# Patient Record
Sex: MALE | Race: WHITE
[De-identification: names, ages, dates, MRNs, and addresses within clinical notes are randomized per-mention and may not be internally consistent; named-entity substitution may affect disease eponyms.]

---

## 2018-04-07 ENCOUNTER — HOSPITAL ENCOUNTER (EMERGENCY)
Dept: HOSPITAL 17 - PHED | Age: 83
Discharge: HOME | End: 2018-04-07
Payer: OTHER GOVERNMENT

## 2018-04-07 VITALS
OXYGEN SATURATION: 97 % | DIASTOLIC BLOOD PRESSURE: 51 MMHG | TEMPERATURE: 98.5 F | SYSTOLIC BLOOD PRESSURE: 82 MMHG | HEART RATE: 75 BPM | RESPIRATION RATE: 16 BRPM

## 2018-04-07 VITALS
RESPIRATION RATE: 18 BRPM | OXYGEN SATURATION: 95 % | SYSTOLIC BLOOD PRESSURE: 82 MMHG | DIASTOLIC BLOOD PRESSURE: 55 MMHG | HEART RATE: 76 BPM

## 2018-04-07 VITALS
DIASTOLIC BLOOD PRESSURE: 58 MMHG | RESPIRATION RATE: 18 BRPM | OXYGEN SATURATION: 98 % | SYSTOLIC BLOOD PRESSURE: 88 MMHG | HEART RATE: 77 BPM

## 2018-04-07 VITALS — WEIGHT: 174.17 LBS | BODY MASS INDEX: 27.99 KG/M2 | HEIGHT: 66 IN

## 2018-04-07 DIAGNOSIS — Z95.1: ICD-10-CM

## 2018-04-07 DIAGNOSIS — Z79.01: ICD-10-CM

## 2018-04-07 DIAGNOSIS — I25.10: ICD-10-CM

## 2018-04-07 DIAGNOSIS — I95.2: ICD-10-CM

## 2018-04-07 DIAGNOSIS — D64.9: ICD-10-CM

## 2018-04-07 DIAGNOSIS — N28.9: ICD-10-CM

## 2018-04-07 DIAGNOSIS — R60.0: Primary | ICD-10-CM

## 2018-04-07 DIAGNOSIS — E88.09: ICD-10-CM

## 2018-04-07 DIAGNOSIS — Z79.899: ICD-10-CM

## 2018-04-07 DIAGNOSIS — Z95.0: ICD-10-CM

## 2018-04-07 DIAGNOSIS — I50.84: ICD-10-CM

## 2018-04-07 LAB
ALBUMIN SERPL-MCNC: 2.7 GM/DL (ref 3.4–5)
ALP SERPL-CCNC: 83 U/L (ref 45–117)
ALT SERPL-CCNC: 26 U/L (ref 12–78)
AST SERPL-CCNC: 21 U/L (ref 15–37)
BASOPHILS # BLD AUTO: 0.1 TH/MM3 (ref 0–0.2)
BASOPHILS NFR BLD: 0.9 % (ref 0–2)
BILIRUB SERPL-MCNC: 1.1 MG/DL (ref 0.2–1)
BUN SERPL-MCNC: 47 MG/DL (ref 7–18)
CALCIUM SERPL-MCNC: 8.5 MG/DL (ref 8.5–10.1)
CHLORIDE SERPL-SCNC: 109 MEQ/L (ref 98–107)
CREAT SERPL-MCNC: 1.6 MG/DL (ref 0.6–1.3)
EOSINOPHIL # BLD: 0.1 TH/MM3 (ref 0–0.4)
EOSINOPHIL NFR BLD: 1.2 % (ref 0–4)
ERYTHROCYTE [DISTWIDTH] IN BLOOD BY AUTOMATED COUNT: 19.1 % (ref 11.6–17.2)
GFR SERPLBLD BASED ON 1.73 SQ M-ARVRAT: 41 ML/MIN (ref 89–?)
GLUCOSE SERPL-MCNC: 124 MG/DL (ref 74–106)
HCO3 BLD-SCNC: 21.2 MEQ/L (ref 21–32)
HCT VFR BLD CALC: 27.8 % (ref 39–51)
HGB BLD-MCNC: 9.9 GM/DL (ref 13–17)
LYMPHOCYTES # BLD AUTO: 0.4 TH/MM3 (ref 1–4.8)
LYMPHOCYTES NFR BLD AUTO: 5.5 % (ref 9–44)
MCH RBC QN AUTO: 34.2 PG (ref 27–34)
MCHC RBC AUTO-ENTMCNC: 35.6 % (ref 32–36)
MCV RBC AUTO: 96.1 FL (ref 80–100)
MONOCYTE #: 0.4 TH/MM3 (ref 0–0.9)
MONOCYTES NFR BLD: 6.4 % (ref 0–8)
NEUTROPHILS # BLD AUTO: 5.8 TH/MM3 (ref 1.8–7.7)
NEUTROPHILS NFR BLD AUTO: 86 % (ref 16–70)
PLATELET # BLD: 164 TH/MM3 (ref 150–450)
PMV BLD AUTO: 7.7 FL (ref 7–11)
PROT SERPL-MCNC: 6.1 GM/DL (ref 6.4–8.2)
RBC # BLD AUTO: 2.89 MIL/MM3 (ref 4.5–5.9)
SODIUM SERPL-SCNC: 139 MEQ/L (ref 136–145)
WBC # BLD AUTO: 6.8 TH/MM3 (ref 4–11)

## 2018-04-07 PROCEDURE — 83880 ASSAY OF NATRIURETIC PEPTIDE: CPT

## 2018-04-07 PROCEDURE — 80053 COMPREHEN METABOLIC PANEL: CPT

## 2018-04-07 PROCEDURE — 85025 COMPLETE CBC W/AUTO DIFF WBC: CPT

## 2018-04-07 PROCEDURE — 71045 X-RAY EXAM CHEST 1 VIEW: CPT

## 2018-04-07 PROCEDURE — 93005 ELECTROCARDIOGRAM TRACING: CPT

## 2018-04-07 NOTE — PD
HPI


Chief Complaint:  Edema


Time Seen by Provider:  15:29


Travel History


International Travel<30 days:  No


Contact w/Intl Traveler<30days:  No


Traveled to known affect area:  No





History of Present Illness


HPI


Patient presents with concerns of lower extremity swelling.  He is [place] 

[place] and [place] out more frequently.  History of heart failure

, coronary artery disease with history of CABG and Defibrillator placement.  

Reports some form of structural damage that was unable to be repaired currently 

on Coumadin.  Patient denies shortness of breath but daughter reports concerns 

of a new mild cough over the last 3 days.  Denies nausea vomiting diarrhea or 

fever.  Compliant with medications.  He does not use support hose.  He does not 

use fluid restriction or daily weights.  He has not been adhering to salt 

restriction.





PFSH


Past Medical History


High Cholesterol:  Yes


Congestive Heart Failure:  Yes


Coronary Artery Disease:  Yes


Diminished Hearing:  No


Influenza Vaccination:  Yes


Pregnant?:  Not Pregnant





Past Surgical History


Cardiac Surgery:  Yes (pacer/defib)


Coronary Artery Bypass Graft:  Yes


Pacemaker:  Yes





Social History


Alcohol Use:  No


Tobacco Use:  No





Allergies-Medications


(Allergen,Severity, Reaction):  


Coded Allergies:  


     No Known Allergies (Unverified , 4/7/18)


Reported Meds & Prescriptions





Reported Meds & Active Scripts


Active


Coreg (Carvedilol) 3.125 Mg Tab 3.125 Mg PO BID


Reported


Potassium Chloride ER (Potassium Chloride) 10 Meq Cap 0 PO BID


Aspirin 81 Mg Chew 81 Mg CHEW DAILY


Entresto (Sacubitril-Valsartan) 24-26 Mg Tab 1 Tab PO DAILY


Coreg (Carvedilol) 25 Mg Tab 25 Mg PO BID


Lipitor (Atorvastatin Calcium) 40 Mg Tab 40 Mg PO HS


Warfarin 2 Mg Tab 2 Mg PO DAILY


Furosemide 20 Mg Tab 20 Mg PO BID








Review of Systems


General / Constitutional:  No: Fever


Eyes:  No: Visual changes


HENT:  No: Headaches


Cardiovascular:  No: Chest Pain or Discomfort


Respiratory:  Positive: Cough, No: Shortness of Breath


Gastrointestinal:  No: Abdominal Pain


Genitourinary:  No: Dysuria


Musculoskeletal:  No: Pain


Skin:  No Rash


Neurologic:  No: Weakness


Psychiatric:  No: Depression


Endocrine:  No: Polydipsia


Hematologic/Lymphatic:  No: Easy Bruising





Physical Exam


Narrative


GENERAL: Well-nourished, well-developed patient.


SKIN: Focused skin assessment warm/dry.


HEAD: Normocephalic.


EYES: No scleral icterus. No injection or drainage. 


NECK: Supple, trachea midline. No JVD or lymphadenopathy.


CARDIOVASCULAR: Regular rate and rhythm without murmurs, gallops, or rubs. 


RESPIRATORY: Breath sounds equal bilaterally. No accessory muscle use.


GASTROINTESTINAL: Abdomen soft, non-tender, nondistended. 


MUSCULOSKELETAL: No cyanosis, 2-3+ lower extremity edema. 


BACK: Nontender without obvious deformity. No CVA tenderness.





Data


Data


Last Documented VS





Vital Signs








  Date Time  Temp Pulse Resp B/P (MAP) Pulse Ox O2 Delivery O2 Flow Rate FiO2


 


4/7/18 16:27  77 18 88/58 (68) 98 Room Air  


 


4/7/18 15:03 98.5       








Orders





 Orders


Complete Blood Count With Diff (4/7/18 15:07)


Comprehensive Metabolic Panel (4/7/18 15:07)


Urinalysis - C+S If Indicated (4/7/18 15:07)


Iv Access Insert/Monitor (4/7/18 15:07)


Oxygen Administration (4/7/18 15:07)


Oximetry (4/7/18 15:07)


B-Type Natriuretic Peptide (4/7/18 15:29)


Electrocardiogram (4/7/18 15:29)


Ecg Monitoring (4/7/18 15:29)


Chest, Single Ap (4/7/18 15:29)


Sodium Chloride 0.9% Flush (Ns Flush) (4/7/18 15:30)





Labs





Laboratory Tests








Test


  4/7/18


15:30


 


White Blood Count 6.8 TH/MM3 


 


Red Blood Count 2.89 MIL/MM3 


 


Hemoglobin 9.9 GM/DL 


 


Hematocrit 27.8 % 


 


Mean Corpuscular Volume 96.1 FL 


 


Mean Corpuscular Hemoglobin 34.2 PG 


 


Mean Corpuscular Hemoglobin


Concent 35.6 % 


 


 


Red Cell Distribution Width 19.1 % 


 


Platelet Count 164 TH/MM3 


 


Mean Platelet Volume 7.7 FL 


 


Neutrophils (%) (Auto) 86.0 % 


 


Lymphocytes (%) (Auto) 5.5 % 


 


Monocytes (%) (Auto) 6.4 % 


 


Eosinophils (%) (Auto) 1.2 % 


 


Basophils (%) (Auto) 0.9 % 


 


Neutrophils # (Auto) 5.8 TH/MM3 


 


Lymphocytes # (Auto) 0.4 TH/MM3 


 


Monocytes # (Auto) 0.4 TH/MM3 


 


Eosinophils # (Auto) 0.1 TH/MM3 


 


Basophils # (Auto) 0.1 TH/MM3 


 


CBC Comment DIFF FINAL 


 


Differential Comment  


 


Blood Urea Nitrogen 47 MG/DL 


 


Creatinine 1.60 MG/DL 


 


Random Glucose 124 MG/DL 


 


Total Protein 6.1 GM/DL 


 


Albumin 2.7 GM/DL 


 


Calcium Level 8.5 MG/DL 


 


Alkaline Phosphatase 83 U/L 


 


Aspartate Amino Transf


(AST/SGOT) 21 U/L 


 


 


Alanine Aminotransferase


(ALT/SGPT) 26 U/L 


 


 


Total Bilirubin 1.1 MG/DL 


 


Sodium Level 139 MEQ/L 


 


Potassium Level 4.2 MEQ/L 


 


Chloride Level 109 MEQ/L 


 


Carbon Dioxide Level 21.2 MEQ/L 


 


Anion Gap 9 MEQ/L 


 


Estimat Glomerular Filtration


Rate 41 ML/MIN 


 


 


B-Type Natriuretic Peptide 275 PG/ML 











MDM


Medical Decision Making


Medical Screen Exam Complete:  Yes


Emergency Medical Condition:  Yes


Differential Diagnosis


CHF exacerbation, cough, fluid retention, poor dietary control


Narrative Course


EKG reveals a paced rhythm rate 75 assessment plan discussed the patient wife 

and daughter at bedside.  EKG reveals a paced rhythm rate of 75.  CMP reveals a 

renal insufficiency with a BUN/creatinine of 47 and 1.6 with a GFR of 41.  BNP 

is mildly elevated at 275.  Albumin of 2.7.  CBC reveals hemoglobin of 9.9.  

Medications reviewed and discussed with family.  Recommendations to decrease 

Coreg from 25 mg twice daily to 3.125 mg twice daily.


Last 72 hours Impressions








Chest X-Ray 4/7/18 1529 Signed





Impressions: 





 Service Date/Time:  Saturday, April 7, 2018 15:37 - CONCLUSION:  1. 

Cardiomegaly 





 status post CABG 2. ICD 3. Hypoaerated lungs without evidence of acute 

airspace 





 disease or congestion.     Nikita Aguilar MD 











Diagnosis





 Primary Impression:  


 Fluid retention in legs


 Additional Impressions:  


 Hypotension


 Qualified Codes:  I95.2 - Hypotension due to drugs


 Renal insufficiency


 Hypoalbuminemia


 Anemia


 Qualified Codes:  D64.9 - Anemia, unspecified


 Heart failure


 Qualified Codes:  I50.84 - End stage heart failure


Patient Instructions:  General Instructions





***Additional Instructions:  


Encouraged fluid restriction of 1500 cc per day, encouraged daily weights.  

Ensure shakes to increase albumin and assist with oncotic pressures.  

Encouraged support hose every morning and elevation of feet when able.  

Encouraged to avoid eating out in excess and observation of daily salt intake.  

Once his blood pressure improves patient was encouraged to take Lasix 2 a day 

for 2-3 days until lower extremity edema improves.  Encouraged to contact his 

PCP or cardiologist Monday morning to discuss blood pressure and above-

mentioned suggestions.  Encouraged to return to the emergency room with any 

onset of new symptoms or worsening edema/shortness of breath.


***Med/Other Pt SpecificInfo:  Prescription(s) given


Scripts


Carvedilol (Coreg) 3.125 Mg Tab


3.125 MG PO BID for Blood Pressure Management, #60 TAB 0 Refills


   Prov: Edin Jean Baptiste MD         4/7/18


Disposition:  01 DISCHARGE HOME


Condition:  Good











Edin Jean Baptiste MD Apr 7, 2018 15:40

## 2018-04-07 NOTE — RADRPT
EXAM DATE/TIME:  04/07/2018 15:37 

 

[facility] COMPARISON:     

No previous studies available for comparison.

 

                     

INDICATIONS :     

Short of breath

 

                     

MEDICAL HISTORY :     

Myocardial infarction.       Hypotension.    

 

SURGICAL HISTORY :     

CABG. Pacemaker.  

 

ENCOUNTER:     

Initial                                        

 

ACUITY:     

1 day      

 

PAIN SCORE:     

0/10

 

LOCATION:     

Bilateral chest 

 

FINDINGS:     

The lungs are hypo-aerated but otherwise clear.

 

Heart is mildly enlarged. The sternotomy wires from prior CABG are noted. ICD device is noted in plac
e.

 

No acute bony abnormalities identified.

 

CONCLUSION:     

1. Cardiomegaly status post CABG

2. ICD

3. Hypoaerated lungs without evidence of acute airspace disease or congestion.

 

 

 

 Nikita Aguilar MD on April 07, 2018 at 16:18           

Board Certified Radiologist.

 This report was verified electronically.

## 2018-04-09 NOTE — EKG
Date Performed: 04/07/2018       Time Performed: 15:50:05

 

PTAGE:      83 years

 

EKG:      ELECTRONIC VENTRICULAR PACEMAKER ABNORMAL RHYTHM ECG 

 

NO PREVIOUS TRACING            

 

DOCTOR:   Jj Collado  Interpretating Date/Time  04/09/2018 09:23:42